# Patient Record
Sex: MALE | URBAN - METROPOLITAN AREA
[De-identification: names, ages, dates, MRNs, and addresses within clinical notes are randomized per-mention and may not be internally consistent; named-entity substitution may affect disease eponyms.]

---

## 2017-08-24 ENCOUNTER — TELEPHONE (OUTPATIENT)
Dept: INTERNAL MEDICINE CLINIC | Facility: CLINIC | Age: 47
End: 2017-08-24

## 2017-08-24 NOTE — TELEPHONE ENCOUNTER
ATENOLOL 25MG TAB  TAKE 1 TAB PO QD #90    THIS MEDICATION IS ON BACK ORDER AND NOT AVAILABLE. IS THERE SOMETHING ELSE YOU WOULD LIKE TO PRESCRIBE?  IF SO SEND A NEW SCRIPT